# Patient Record
Sex: MALE | Race: WHITE | NOT HISPANIC OR LATINO | Employment: FULL TIME | URBAN - METROPOLITAN AREA
[De-identification: names, ages, dates, MRNs, and addresses within clinical notes are randomized per-mention and may not be internally consistent; named-entity substitution may affect disease eponyms.]

---

## 2023-09-27 ENCOUNTER — OFFICE VISIT (OUTPATIENT)
Dept: URGENT CARE | Facility: URGENT CARE | Age: 33
End: 2023-09-27
Payer: COMMERCIAL

## 2023-09-27 ENCOUNTER — ANCILLARY PROCEDURE (OUTPATIENT)
Dept: GENERAL RADIOLOGY | Facility: CLINIC | Age: 33
End: 2023-09-27
Attending: NURSE PRACTITIONER
Payer: COMMERCIAL

## 2023-09-27 VITALS
TEMPERATURE: 97.4 F | DIASTOLIC BLOOD PRESSURE: 80 MMHG | RESPIRATION RATE: 16 BRPM | OXYGEN SATURATION: 98 % | BODY MASS INDEX: 27.28 KG/M2 | HEART RATE: 78 BPM | HEIGHT: 69 IN | WEIGHT: 184.2 LBS | SYSTOLIC BLOOD PRESSURE: 126 MMHG

## 2023-09-27 DIAGNOSIS — R11.0 NAUSEA: ICD-10-CM

## 2023-09-27 DIAGNOSIS — R10.13 ABDOMINAL PAIN, EPIGASTRIC: Primary | ICD-10-CM

## 2023-09-27 PROCEDURE — 74019 RADEX ABDOMEN 2 VIEWS: CPT | Mod: TC | Performed by: RADIOLOGY

## 2023-09-27 PROCEDURE — 87635 SARS-COV-2 COVID-19 AMP PRB: CPT | Performed by: NURSE PRACTITIONER

## 2023-09-27 PROCEDURE — 99204 OFFICE O/P NEW MOD 45 MIN: CPT | Performed by: NURSE PRACTITIONER

## 2023-09-27 RX ORDER — ONDANSETRON 4 MG/1
4 TABLET, ORALLY DISINTEGRATING ORAL ONCE
Status: COMPLETED | OUTPATIENT
Start: 2023-09-27 | End: 2023-09-27

## 2023-09-27 RX ORDER — CETIRIZINE HYDROCHLORIDE 10 MG/1
20 TABLET ORAL DAILY
COMMUNITY

## 2023-09-27 RX ORDER — TRIAMCINOLONE ACETONIDE 55 UG/1
2 SPRAY, METERED NASAL DAILY
COMMUNITY

## 2023-09-27 RX ADMIN — ONDANSETRON 4 MG: 4 TABLET, ORALLY DISINTEGRATING ORAL at 11:50

## 2023-09-27 ASSESSMENT — PAIN SCALES - GENERAL: PAINLEVEL: MODERATE PAIN (5)

## 2023-09-27 NOTE — PROGRESS NOTES
Clinic Administered Medication Documentation    Patient was given Zofran. Prior to medication administration, verified patient's identity using patient's name and date of birth.    Albert Hou LPN

## 2023-09-27 NOTE — PATIENT INSTRUCTIONS
1. Take Tylenol as needed for pain control.  2. Start a clear liquid diet for the next 48 hours, advance your diet as tolerated. Start advancing this thick liquids then soft solids.   3. Follow up  if pain is no improving over the next 5 days. Seek emergency medical attention if you are developing fevers, more severe abdominal pain, or vomiting.   GI referral placed today in the event this does not resolve with conservative measures and you have ongoing symptoms.

## 2023-09-27 NOTE — PROGRESS NOTES
"SUBJECTIVE  HPI:  Rex Mackay is a 33 year old male who presents with the CC of abdominal/pelvic pain.    Pain is located in the epigastric area, with radiation to None.  The pain is characterized as a knot or grabbing sensation and at worst is a level 5 on a scale of 1-10.  Pain has been present for 1 day(s) and is fluctuating.  EXACERBATING FACTORS: activity of any sort  RELIEVING FACTORS: position lying down.  ASSOCIATED SX: nausea, diarrhea, and chills.  Pt tried TUMs and pepcid, which did not help his symptoms    No past medical history on file.  Current Outpatient Medications   Medication Sig Dispense Refill    cetirizine (ZYRTEC) 10 MG tablet Take 20 mg by mouth daily      Multiple Vitamin (ONE-A-DAY MENS PO)       triamcinolone (NASACORT) 55 MCG/ACT nasal aerosol Spray 2 sprays into both nostrils daily       Social History     Tobacco Use    Smoking status: Never     Passive exposure: Never    Smokeless tobacco: Never   Substance Use Topics    Alcohol use: Not on file         OBJECTIVE:  /80 (BP Location: Left arm, Patient Position: Sitting, Cuff Size: Adult Regular)   Pulse 78   Temp 97.4  F (36.3  C) (Tympanic)   Resp 16   Ht 1.753 m (5' 9\")   Wt 83.6 kg (184 lb 3.2 oz)   SpO2 98%   BMI 27.20 kg/m    GENERAL APPEARANCE: healthy, alert and no distress  EYES: EOMI,  PERRL, conjunctiva clear  HENT: ear canals and TM's normal.  Nose and mouth without ulcers, erythema or lesions  NECK: supple, nontender, no lymphadenopathy  RESP: lungs clear to auscultation - no rales, rhonchi or wheezes  CV: regular rates and rhythm, normal S1 S2, no murmur noted  ABDOMEN:  distended, slight tenderness to left lower quadrant, no HSM or masses and + BS x4 hyperactive  NEURO: Normal strength and tone, sensory exam grossly normal,  normal speech and mentation  SKIN: no suspicious lesions or rashes    Abd Xray: radiology read: normal.   Bowel gas pattern is within normal limits. No " radiographic  evidence for bowel obstruction. Unremarkable amount of stool  throughout the colon. No free intraperitoneal air. No convincing  urinary calculi.     Discussed possible viral etiology and this being early on if viral. Can expect waxing and waning of symptoms over the next few days if viral.  Zofran given in clinic was not helpful for the stomach discomfort.  GI cocktail given in clinic did help. Will send home with omeprazole to reduce stomach acid.  Discussed possible need for further testing with GI.    ASSESSMENT:  1. Abdominal pain, epigastric      2. Nausea    - ondansetron (ZOFRAN ODT) ODT tab 4 mg  - XR Abdomen 2 Views; Future    PLAN:  1. Take Tylenol as needed for pain control.  2. Start a clear liquid diet for the next 48 hours, advance your diet as tolerated. Start advancing this thick liquids then soft solids.   3. Follow up  if pain is no improving over the next 5 days. Seek emergency medical attention if you are developing fevers, more severe abdominal pain, or vomiting.   GI referral placed today in the event this does not resolve with conservative measures and you have ongoing symptoms.

## 2023-09-28 LAB — SARS-COV-2 RNA RESP QL NAA+PROBE: NEGATIVE

## 2023-10-22 ENCOUNTER — HEALTH MAINTENANCE LETTER (OUTPATIENT)
Age: 33
End: 2023-10-22

## 2023-11-08 ENCOUNTER — OFFICE VISIT (OUTPATIENT)
Dept: URGENT CARE | Facility: URGENT CARE | Age: 33
End: 2023-11-08
Payer: COMMERCIAL

## 2023-11-08 VITALS
SYSTOLIC BLOOD PRESSURE: 129 MMHG | DIASTOLIC BLOOD PRESSURE: 84 MMHG | HEART RATE: 81 BPM | RESPIRATION RATE: 20 BRPM | WEIGHT: 182.3 LBS | BODY MASS INDEX: 26.92 KG/M2 | TEMPERATURE: 97.9 F | OXYGEN SATURATION: 97 %

## 2023-11-08 DIAGNOSIS — R05.1 ACUTE COUGH: Primary | ICD-10-CM

## 2023-11-08 PROCEDURE — 87635 SARS-COV-2 COVID-19 AMP PRB: CPT | Performed by: NURSE PRACTITIONER

## 2023-11-08 PROCEDURE — 99213 OFFICE O/P EST LOW 20 MIN: CPT | Performed by: NURSE PRACTITIONER

## 2023-11-08 NOTE — PROGRESS NOTES
SUBJECTIVE:  Rex Mackay is a 33 year old male who presents to the clinic today with a chief complaint of cough  for 5 days(s).  His cough is described as nonproductive. The patient's symptoms are moderate and not changing over the course of time.  Associated symptoms include nasal congestion, ear pain, and sore throat. The patient's symptoms are exacerbated by no particular triggers and soda may make cough worse.   Patient has been using allergy meds, salt water gargles, drinks with honey to improve symptoms.  Has recently returned from [place], returned 3 days ago.    No past medical history on file.    Current Outpatient Medications   Medication Sig Dispense Refill    cetirizine (ZYRTEC) 10 MG tablet Take 20 mg by mouth daily      Multiple Vitamin (ONE-A-DAY MENS PO)       triamcinolone (NASACORT) 55 MCG/ACT nasal aerosol Spray 2 sprays into both nostrils daily         Social History     Tobacco Use    Smoking status: Never     Passive exposure: Never    Smokeless tobacco: Never   Substance Use Topics    Alcohol use: Not on file       OBJECTIVE:  /84   Pulse 81   Temp 97.9  F (36.6  C) (Tympanic)   Resp 20   Wt 82.7 kg (182 lb 4.8 oz)   SpO2 97%   BMI 26.92 kg/m    GENERAL APPEARANCE: healthy, alert and no distress  EYES: EOMI,  PERRL, conjunctiva clear  HENT: ear canals and TM's normal.  Nose and mouth without ulcers, erythema or lesions  NECK: supple, nontender, no lymphadenopathy  RESP: lungs clear to auscultation - no rales, rhonchi or wheezes  CV: regular rates and rhythm, normal S1 S2, no murmur noted  ABDOMEN:  soft, nontender, no HSM or masses and bowel sounds normal  NEURO: Normal strength and tone, sensory exam grossly normal,  normal speech and mentation  SKIN: no suspicious lesions or rashes    Covid: pending    ASSESSMENT:    1. Acute cough    - Symptomatic COVID-19 Virus (Coronavirus) by PCR Nose    PLAN:  1) Increase fluids and rest  2) Try Mucinex and Neti pot over the  counter for congestion  3) Continue taking Tylenol/Ibuprofen for fever/pain relief as needed.  4) Salt water gargles and lozenges can be helpful for throat relief  5) You will only be notified if the Covid results are positive.

## 2023-11-08 NOTE — PATIENT INSTRUCTIONS
1) Increase fluids and rest  2) Try Mucinex and Neti pot over the counter for congestion  3) Continue taking Tylenol/Ibuprofen for fever/pain relief as needed.  4) Salt water gargles and lozenges can be helpful for throat relief  5) You will only be notified if the Covid results are positive.

## 2023-11-09 LAB — SARS-COV-2 RNA RESP QL NAA+PROBE: NEGATIVE

## 2024-12-15 ENCOUNTER — HEALTH MAINTENANCE LETTER (OUTPATIENT)
Age: 34
End: 2024-12-15